# Patient Record
Sex: FEMALE | Race: WHITE | NOT HISPANIC OR LATINO
[De-identification: names, ages, dates, MRNs, and addresses within clinical notes are randomized per-mention and may not be internally consistent; named-entity substitution may affect disease eponyms.]

---

## 2021-01-06 ENCOUNTER — NON-APPOINTMENT (OUTPATIENT)
Age: 66
End: 2021-01-06

## 2021-01-06 DIAGNOSIS — N84.0 POLYP OF CORPUS UTERI: ICD-10-CM

## 2021-01-06 DIAGNOSIS — Z80.0 FAMILY HISTORY OF MALIGNANT NEOPLASM OF DIGESTIVE ORGANS: ICD-10-CM

## 2021-01-06 DIAGNOSIS — Z78.9 OTHER SPECIFIED HEALTH STATUS: ICD-10-CM

## 2021-01-06 DIAGNOSIS — K21.9 GASTRO-ESOPHAGEAL REFLUX DISEASE W/OUT ESOPHAGITIS: ICD-10-CM

## 2021-01-06 DIAGNOSIS — Z80.49 FAMILY HISTORY OF MALIGNANT NEOPLASM OF OTHER GENITAL ORGANS: ICD-10-CM

## 2021-01-06 DIAGNOSIS — Z80.41 FAMILY HISTORY OF MALIGNANT NEOPLASM OF OVARY: ICD-10-CM

## 2021-01-06 DIAGNOSIS — Z80.3 FAMILY HISTORY OF MALIGNANT NEOPLASM OF BREAST: ICD-10-CM

## 2021-01-06 DIAGNOSIS — Z80.1 FAMILY HISTORY OF MALIGNANT NEOPLASM OF TRACHEA, BRONCHUS AND LUNG: ICD-10-CM

## 2021-01-06 PROBLEM — Z00.00 ENCOUNTER FOR PREVENTIVE HEALTH EXAMINATION: Status: ACTIVE | Noted: 2021-01-06

## 2021-01-14 ENCOUNTER — APPOINTMENT (OUTPATIENT)
Dept: BREAST CENTER | Facility: CLINIC | Age: 66
End: 2021-01-14
Payer: COMMERCIAL

## 2021-01-14 VITALS
SYSTOLIC BLOOD PRESSURE: 109 MMHG | DIASTOLIC BLOOD PRESSURE: 69 MMHG | WEIGHT: 90 LBS | BODY MASS INDEX: 17.67 KG/M2 | HEIGHT: 60 IN | HEART RATE: 73 BPM

## 2021-01-14 DIAGNOSIS — Z85.3 PERSONAL HISTORY OF MALIGNANT NEOPLASM OF BREAST: ICD-10-CM

## 2021-01-14 PROCEDURE — 99072 ADDL SUPL MATRL&STAF TM PHE: CPT

## 2021-01-14 PROCEDURE — 99213 OFFICE O/P EST LOW 20 MIN: CPT

## 2021-01-14 RX ORDER — ESOMEPRAZOLE MAGNESIUM 40 MG/1
40 CAPSULE, DELAYED RELEASE ORAL
Refills: 0 | Status: DISCONTINUED | COMMUNITY
End: 2021-01-14

## 2021-01-14 RX ORDER — GUAIFENESIN 400 MG
TABLET ORAL
Refills: 0 | Status: DISCONTINUED | COMMUNITY
End: 2021-01-14

## 2021-01-14 RX ORDER — RALOXIFENE HYDROCHLORIDE 60 MG/1
60 TABLET, FILM COATED ORAL
Refills: 0 | Status: ACTIVE | COMMUNITY

## 2021-01-14 NOTE — PHYSICAL EXAM
[No Supraclavicular Adenopathy] : no supraclavicular adenopathy [No Cervical Adenopathy] : no cervical adenopathy [Clear to Auscultation Bilat] : clear to auscultation bilaterally [Examined in the supine and seated position] : examined in the supine and seated position [Symmetrical] : symmetrical [No dominant masses] : no dominant masses in right breast  [No dominant masses] : no dominant masses left breast [No Nipple Retraction] : no left nipple retraction [No Nipple Discharge] : no left nipple discharge [Breast Abnormal Lactation (Galactorrhea)] : no galactorrhea [Breast Abnormal Secretion Bloody Fluid] : no bloody discharge [Breast Abnormal Secretion Serous Fluid] : no serous discharge [Breast Abnormal Secretion Opalescent Fluid] : no milky discharge [No Axillary Lymphadenopathy] : no left axillary lymphadenopathy [Soft] : abdomen soft [Not Tender] : non-tender [No Hepato-Splenomegaly] : no hepato-splenomegaly [No Rashes] : no rashes

## 2021-02-08 ENCOUNTER — TRANSCRIPTION ENCOUNTER (OUTPATIENT)
Age: 66
End: 2021-02-08

## 2021-12-16 ENCOUNTER — FORM ENCOUNTER (OUTPATIENT)
Age: 66
End: 2021-12-16

## 2021-12-17 ENCOUNTER — TRANSCRIPTION ENCOUNTER (OUTPATIENT)
Age: 66
End: 2021-12-17

## 2021-12-17 ENCOUNTER — FORM ENCOUNTER (OUTPATIENT)
Age: 66
End: 2021-12-17

## 2022-01-13 DIAGNOSIS — Z12.31 ENCOUNTER FOR SCREENING MAMMOGRAM FOR MALIGNANT NEOPLASM OF BREAST: ICD-10-CM

## 2022-01-19 ENCOUNTER — NON-APPOINTMENT (OUTPATIENT)
Age: 67
End: 2022-01-19

## 2022-01-19 ENCOUNTER — APPOINTMENT (OUTPATIENT)
Dept: BREAST CENTER | Facility: CLINIC | Age: 67
End: 2022-01-19
Payer: MEDICARE

## 2022-01-19 VITALS
SYSTOLIC BLOOD PRESSURE: 109 MMHG | WEIGHT: 90 LBS | OXYGEN SATURATION: 98 % | DIASTOLIC BLOOD PRESSURE: 68 MMHG | BODY MASS INDEX: 17.58 KG/M2 | HEART RATE: 72 BPM

## 2022-01-19 DIAGNOSIS — R92.2 INCONCLUSIVE MAMMOGRAM: ICD-10-CM

## 2022-01-19 PROCEDURE — 99213 OFFICE O/P EST LOW 20 MIN: CPT

## 2022-01-19 NOTE — HISTORY OF PRESENT ILLNESS
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  Patient is currently taking and tolerating raloxifene.  Today's mammogram and ultrasound was unremarkable BI-RADS 2.  Patient has never taken hormone replacement therapy.

## 2022-06-17 ENCOUNTER — RESULT REVIEW (OUTPATIENT)
Age: 67
End: 2022-06-17

## 2023-01-24 ENCOUNTER — RESULT REVIEW (OUTPATIENT)
Age: 68
End: 2023-01-24

## 2023-01-26 ENCOUNTER — APPOINTMENT (OUTPATIENT)
Dept: BREAST CENTER | Facility: CLINIC | Age: 68
End: 2023-01-26
Payer: MEDICARE

## 2023-01-26 VITALS
HEIGHT: 60 IN | HEART RATE: 69 BPM | WEIGHT: 93 LBS | SYSTOLIC BLOOD PRESSURE: 125 MMHG | BODY MASS INDEX: 18.26 KG/M2 | DIASTOLIC BLOOD PRESSURE: 80 MMHG | OXYGEN SATURATION: 99 %

## 2023-01-26 DIAGNOSIS — N60.92 UNSPECIFIED BENIGN MAMMARY DYSPLASIA OF LEFT BREAST: ICD-10-CM

## 2023-01-26 PROCEDURE — 99213 OFFICE O/P EST LOW 20 MIN: CPT

## 2023-01-30 PROBLEM — N60.92 ATYPICAL DUCTAL HYPERPLASIA OF LEFT BREAST: Status: ACTIVE | Noted: 2021-01-14

## 2023-11-03 ENCOUNTER — NON-APPOINTMENT (OUTPATIENT)
Age: 68
End: 2023-11-03

## 2024-01-30 ENCOUNTER — APPOINTMENT (OUTPATIENT)
Dept: BREAST CENTER | Facility: CLINIC | Age: 69
End: 2024-01-30
Payer: MEDICARE

## 2024-01-30 VITALS
WEIGHT: 92 LBS | SYSTOLIC BLOOD PRESSURE: 122 MMHG | DIASTOLIC BLOOD PRESSURE: 73 MMHG | BODY MASS INDEX: 18.06 KG/M2 | HEIGHT: 60 IN | OXYGEN SATURATION: 100 % | HEART RATE: 74 BPM

## 2024-01-30 DIAGNOSIS — D05.12 INTRADUCTAL CARCINOMA IN SITU OF LEFT BREAST: ICD-10-CM

## 2024-01-30 DIAGNOSIS — Z80.3 FAMILY HISTORY OF MALIGNANT NEOPLASM OF BREAST: ICD-10-CM

## 2024-01-30 PROCEDURE — 99213 OFFICE O/P EST LOW 20 MIN: CPT

## 2024-01-30 NOTE — HISTORY OF PRESENT ILLNESS
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 Maternal grandmother with breast cancer and paternal grandmother with possible ovarian cancer 2016 [employer] gene tested negative  2/20 left partial mastectomy for ADH Raloxifene  Patient denies any breast masses or bone pain.  Patient is currently taking and tolerating raloxifene.  Recent mammogram and ultrasound was unremarkable BI-RADS 2.  Patient has never taken hormone replacement therapy.

## 2024-02-13 ENCOUNTER — APPOINTMENT (OUTPATIENT)
Dept: HEMATOLOGY ONCOLOGY | Facility: CLINIC | Age: 69
End: 2024-02-13

## 2024-02-15 NOTE — DISCUSSION/SUMMARY
[FreeTextEntry1] : The visit was provided via telehealth using real-time 2-way audio visual technology. The patient, Jany Hull, was located at home, [place], [place], at the time of the visit. The Genetic Counselor, Annamaria Youssef, was located in [place], [place]. The patient and the Genetic Counselor both participated in the telehealth encounter. Consent for telehealth services was given on 2024 by the patient, Jany Hull.    REASON FOR CONSULT  Jany Hull is a 68-year-old female who was referred by Dr. Garcia for cancer genetic counseling and to discuss updated genetic testing due to her personal and family history of cancer.    RELEVANT MEDICAL HISTORY  Ms. Hull was diagnosed with a left DCIS in  at age 53.  She was treated with partial left mastectomy and took tamoxifen for 4.5 years. Currently taking Raloxifene for the past 3 years.    FAMILY HISTORY:  Ms. Hull has a family history of breast, pancreatic, prostate and gynecological cancers, see below.   Of note, Ms. Hull initially pursued genetic testing with [facility] which reported no mutations on 2008. Ms. Hull then underwent updated genetic testing with [facility]'s common hereditary cancers panel (42 genes). This testing was ordered by Dr. Garcia's office and was reported negative on 2016 (see scanned reports).   OTHER MEDICAL AND SURGICAL HISTORY:  History of chronic GERD. Shoulder rotator cuff surgery in . Left partial mastectomy in .    PAST OB/GYN HISTORY:  Obstetrical History:  Age at Menarche: 13 Menopausal with LMP at age 58  Age at First Live Birth: 35 Oral Contraceptive Use: Yes, (15 years)  Hormone Replacement Therapy: No   CANCER SCREENING HISTORY:    Breast: Follows up with Dr. Garcia. Last screening mammogram and US - wnl on 2024, heterogeneously dense breast tissue. Prior history of multiple breast biopsies - atypia on right breast reported in  - excisional biopsy (no records). Atypical cells and calcifications on left breast in  prior to left breast DCIS in . Stereotactic biopsy on right breast in . Two calcifications on left breast in  - wide excision. Pathology showed atypical ductal hyperplasia and focal atypical lobular hyperplasia. Offered breast MRI but did not uptake as hesitant regarding false positives and potential need for further biopsies.   GYN: Last pelvic exam 2023, follows up annually.  Colon: Last colonoscopy at age 61. Repeats every 10 years.   Skin: Last FBSE reported normal in 2023. Follows annually. History of precancerous mole removed from back 20 years ago.      SOCIAL HISTORY:  -	 -	Tobacco-product use: No   FAMILY HISTORY:  Maternal ancestry was reported as [place] and paternal ancestry was reported as [place]. A detailed family history of cancer was ascertained. Relevant diagnoses are detailed below and in the scanned pedigree.     To Ms. Hull's knowledge, no one in the family has had germline testing for cancer susceptibility.       RISK ASSESSMENT:  Ms. Hull's previous genetic test results were reviewed with her. [facility] laboratory was contacted to confirm whether there have been any significant updates or relevant genes added to the testing panel since Ms. Hull's most recent testing. It was established that updated genetic testing would have limited clinical utility for Ms. Hull given the reported personal and family cancer history and the previous negative genetic testing for 42 hereditary cancer genes in 2016.   RESULTS INTERPRETATION AND ASSESSMENT:  Given Ms. Hull's personal and current reported family history of cancer, and her negative genetic test results, the following screening guidelines and risk-reducing recommendations were discussed:    BREAST:   - Ms. Hull is status post left partial mastectomy and is currently continuing chemoprevention for her DCIS. Ms. Hull's long-term management and surveillance should be based on her on- or post-treatment protocol as recommended by her breast team.  - Of note, Ms. Hull is currently being followed with annual mammograms and US and should continue with appropriate screening as determined by her breast specialist.   OTHER:   - In the absence of other indications, Ms. Hull should practice age-appropriate cancer screening of other organ systems as recommended for the general population.    We also discussed that, while no cause of the patient's personal and family history of cancer was identified, this result, while reassuring, does not entirely rule out a hereditary cancer risk in the patient. It is possible, although unlikely, the patient has a mutation in one of the genes tested that is not detectable by this analysis, or has a mutation in a different gene, either known or unknown. It is also possible there is a hereditary cancer predisposition in the family, but the patient did not inherit it.    We informed Ms. Hull that our knowledge of genetics and inherited cancer conditions is changing rapidly. Therefore, we recommended that Ms. Hull contact our office, every 2 to 3 years, to discuss relevant advances in cancer genetics.  We emphasized the importance of re-contacting us with updates regarding her personal and family history of cancer as well as any updates regarding additional cancer genetic test results performed for the patient and/or family members.  Such updates could possibly change our risk assessment and recommendations.     In addition, we discussed Ms. Hull's brothers could consider pursuing individualized cancer risk assessment genetic counseling with the option of genetic testing given the family history.     PLAN:  1.	See above for recommended screening and risk-reduction strategies.  2.	Patient informed consult note(s) will be available through their [facility] patient portal.   3.	Ms. Hull was encouraged to contact us every 2-3 years to discuss relevant advances in cancer genetics, or sooner if there are any changes in her personal or family history of cancer.      For any additional questions please call Cancer Genetics at [phone].        Annamaria Youssef, [place], [facility]  Genetic Counselor, Cancer Genetics      CC:   Jany Garcia

## 2025-02-14 ENCOUNTER — APPOINTMENT (OUTPATIENT)
Dept: BREAST CENTER | Facility: CLINIC | Age: 70
End: 2025-02-14
Payer: MEDICARE

## 2025-02-14 VITALS
OXYGEN SATURATION: 98 % | HEART RATE: 72 BPM | SYSTOLIC BLOOD PRESSURE: 111 MMHG | WEIGHT: 90 LBS | DIASTOLIC BLOOD PRESSURE: 69 MMHG | BODY MASS INDEX: 17.67 KG/M2 | HEIGHT: 60 IN

## 2025-02-14 DIAGNOSIS — D05.12 INTRADUCTAL CARCINOMA IN SITU OF LEFT BREAST: ICD-10-CM

## 2025-02-14 DIAGNOSIS — N60.92 UNSPECIFIED BENIGN MAMMARY DYSPLASIA OF LEFT BREAST: ICD-10-CM

## 2025-02-14 DIAGNOSIS — Z80.3 FAMILY HISTORY OF MALIGNANT NEOPLASM OF BREAST: ICD-10-CM

## 2025-02-14 PROCEDURE — 99213 OFFICE O/P EST LOW 20 MIN: CPT

## 2025-09-16 ENCOUNTER — APPOINTMENT (OUTPATIENT)
Dept: OBGYN | Facility: CLINIC | Age: 70
End: 2025-09-16
Payer: MEDICARE

## 2025-09-16 PROCEDURE — 76830 TRANSVAGINAL US NON-OB: CPT | Mod: 26
